# Patient Record
Sex: FEMALE | Race: ASIAN | NOT HISPANIC OR LATINO | Employment: OTHER | URBAN - METROPOLITAN AREA
[De-identification: names, ages, dates, MRNs, and addresses within clinical notes are randomized per-mention and may not be internally consistent; named-entity substitution may affect disease eponyms.]

---

## 2017-01-24 DIAGNOSIS — Z79.4 TYPE 2 DIABETES MELLITUS WITHOUT COMPLICATION, WITH LONG-TERM CURRENT USE OF INSULIN (HCC): ICD-10-CM

## 2017-01-24 DIAGNOSIS — E11.9 TYPE 2 DIABETES MELLITUS WITHOUT COMPLICATION, WITH LONG-TERM CURRENT USE OF INSULIN (HCC): ICD-10-CM

## 2017-02-27 RX ORDER — GLIPIZIDE 10 MG/1
TABLET ORAL
Qty: 60 TAB | Refills: 3 | Status: SHIPPED | OUTPATIENT
Start: 2017-02-27 | End: 2018-04-16

## 2017-03-24 RX ORDER — INSULIN DETEMIR 100 [IU]/ML
INJECTION, SOLUTION SUBCUTANEOUS
Qty: 15 ML | Refills: 5 | Status: SHIPPED | OUTPATIENT
Start: 2017-03-24 | End: 2017-10-31 | Stop reason: SDUPTHER

## 2017-03-27 DIAGNOSIS — I10 ESSENTIAL HYPERTENSION: ICD-10-CM

## 2017-03-27 RX ORDER — LOSARTAN POTASSIUM 50 MG/1
50 TABLET ORAL DAILY
Qty: 90 TAB | Refills: 3 | Status: SHIPPED | OUTPATIENT
Start: 2017-03-27 | End: 2017-04-26 | Stop reason: SDUPTHER

## 2017-03-27 NOTE — TELEPHONE ENCOUNTER
Was the patient seen in the last year in this department? Yes     Does patient have an active prescription for medications requested? No     Received Request Via: Pharmacy       RX : Losartan

## 2017-04-12 ENCOUNTER — TELEPHONE (OUTPATIENT)
Dept: MEDICAL GROUP | Facility: CLINIC | Age: 72
End: 2017-04-12

## 2017-04-12 NOTE — TELEPHONE ENCOUNTER
Per [facility] pharmacy, states pt called requesting that her medication Losartan 50 mg tabs, be switched to Losartan/HCTZ 100-25 MG.    Patient also requesting to take Losartan 50 MG with the Losartan/HCTZ 100-25 MG.  Dr. Ruiz is it okay for pt to take both these medicatios ?    Pharmacy needs clarification.  Please advise, thank you.

## 2017-04-21 ENCOUNTER — HOSPITAL ENCOUNTER (OUTPATIENT)
Dept: LAB | Facility: MEDICAL CENTER | Age: 72
End: 2017-04-21
Attending: INTERNAL MEDICINE
Payer: MEDICARE

## 2017-04-21 DIAGNOSIS — N18.3 CKD (CHRONIC KIDNEY DISEASE), STAGE 3 (MODERATE): ICD-10-CM

## 2017-04-21 DIAGNOSIS — I10 ESSENTIAL HYPERTENSION: ICD-10-CM

## 2017-04-21 DIAGNOSIS — E78.5 DYSLIPIDEMIA: ICD-10-CM

## 2017-04-21 LAB — GFR SERPL CREATININE-BSD FRML MDRD: >60 ML/MIN/1.73 M 2

## 2017-04-21 PROCEDURE — 82565 ASSAY OF CREATININE: CPT

## 2017-04-21 PROCEDURE — 81003 URINALYSIS AUTO W/O SCOPE: CPT

## 2017-04-21 PROCEDURE — 83036 HEMOGLOBIN GLYCOSYLATED A1C: CPT | Mod: GA

## 2017-04-21 PROCEDURE — 83695 ASSAY OF LIPOPROTEIN(A): CPT

## 2017-04-21 PROCEDURE — 36415 COLL VENOUS BLD VENIPUNCTURE: CPT

## 2017-04-22 LAB
APPEARANCE UR: CLEAR
BILIRUB UR QL STRIP.AUTO: NEGATIVE
COLOR UR: NORMAL
CREAT SERPL-MCNC: 0.89 MG/DL (ref 0.5–1.4)
EST. AVERAGE GLUCOSE BLD GHB EST-MCNC: 197 MG/DL
GLUCOSE UR STRIP.AUTO-MCNC: NEGATIVE MG/DL
HBA1C MFR BLD: 8.5 % (ref 0–5.6)
KETONES UR STRIP.AUTO-MCNC: NEGATIVE MG/DL
LEUKOCYTE ESTERASE UR QL STRIP.AUTO: NEGATIVE
MICRO URNS: NORMAL
NITRITE UR QL STRIP.AUTO: NEGATIVE
PH UR STRIP.AUTO: 5.5 [PH]
PROT UR QL STRIP: NEGATIVE MG/DL
RBC UR QL AUTO: NEGATIVE
SP GR UR STRIP.AUTO: 1.02

## 2017-04-23 LAB — LPA SERPL-MCNC: 11 MG/DL

## 2017-04-26 ENCOUNTER — TELEPHONE (OUTPATIENT)
Dept: MEDICAL GROUP | Facility: CLINIC | Age: 72
End: 2017-04-26

## 2017-04-26 ENCOUNTER — OFFICE VISIT (OUTPATIENT)
Dept: MEDICAL GROUP | Facility: CLINIC | Age: 72
End: 2017-04-26
Payer: MEDICARE

## 2017-04-26 VITALS
TEMPERATURE: 97.2 F | DIASTOLIC BLOOD PRESSURE: 80 MMHG | WEIGHT: 140 LBS | RESPIRATION RATE: 16 BRPM | SYSTOLIC BLOOD PRESSURE: 120 MMHG | HEART RATE: 90 BPM | HEIGHT: 61 IN | BODY MASS INDEX: 26.43 KG/M2 | OXYGEN SATURATION: 92 %

## 2017-04-26 DIAGNOSIS — I10 ESSENTIAL HYPERTENSION: ICD-10-CM

## 2017-04-26 DIAGNOSIS — N18.30 CHRONIC KIDNEY DISEASE, STAGE 3 (MODERATE): ICD-10-CM

## 2017-04-26 DIAGNOSIS — E78.5 DYSLIPIDEMIA: ICD-10-CM

## 2017-04-26 DIAGNOSIS — E55.9 VITAMIN D DEFICIENCY: ICD-10-CM

## 2017-04-26 PROCEDURE — G8419 CALC BMI OUT NRM PARAM NOF/U: HCPCS | Performed by: INTERNAL MEDICINE

## 2017-04-26 PROCEDURE — 3014F SCREEN MAMMO DOC REV: CPT | Mod: 8P | Performed by: INTERNAL MEDICINE

## 2017-04-26 PROCEDURE — G8432 DEP SCR NOT DOC, RNG: HCPCS | Performed by: INTERNAL MEDICINE

## 2017-04-26 PROCEDURE — 1101F PT FALLS ASSESS-DOCD LE1/YR: CPT | Performed by: INTERNAL MEDICINE

## 2017-04-26 PROCEDURE — 4040F PNEUMOC VAC/ADMIN/RCVD: CPT | Mod: 8P | Performed by: INTERNAL MEDICINE

## 2017-04-26 PROCEDURE — 3045F PR MOST RECENT HEMOGLOBIN A1C LEVEL 7.0-9.0%: CPT | Performed by: INTERNAL MEDICINE

## 2017-04-26 PROCEDURE — 1036F TOBACCO NON-USER: CPT | Performed by: INTERNAL MEDICINE

## 2017-04-26 PROCEDURE — 99214 OFFICE O/P EST MOD 30 MIN: CPT | Performed by: INTERNAL MEDICINE

## 2017-04-26 RX ORDER — LOSARTAN POTASSIUM 50 MG/1
50 TABLET ORAL DAILY
Qty: 90 TAB | Refills: 3 | Status: SHIPPED | OUTPATIENT
Start: 2017-04-26 | End: 2018-04-10 | Stop reason: SDUPTHER

## 2017-04-26 NOTE — PROGRESS NOTES
Subjective:  Latoya is a 71 y.o. female with the following   Past Medical History   Diagnosis Date   • Hypercholesterolemia    • Hypertension    • Dyslipidemia    • Osteopenia    • Diabetes    • Heart murmur    • Fall       Family History   Problem Relation Age of Onset   • Cancer Mother    • Cancer Father    • Cancer Sister    • Cancer Brother      The patient is on the following medications:   Current outpatient prescriptions:   •  losartan (COZAAR) 50 MG Tab, Take 1 Tab by mouth every day., Disp: 90 Tab, Rfl: 3  •  LEVEMIR 100 UNIT/ML Solution, INJECT 25 UNITS SUBCUTANEOUSLY TWICE DAILY AS DIRECTED, Disp: 15 mL, Rfl: 5  •  glipiZIDE (GLUCOTROL) 10 MG Tab, TAKE ONE TABLET BY MOUTH TWICE DAILY, Disp: 60 Tab, Rfl: 3  •  Insulin Pen Needle (B-D ULTRAFINE III SHORT PEN) 31G X 8 MM Misc, Use q 12 hours, Disp: 100 Each, Rfl: 6  •  syringe 1 ML Misc, Use prior to meal as needed with novolog insulin ., Disp: 100 Each, Rfl: 6  •  insulin aspart (NOVOLOG) 100 UNIT/ML Solution, Use 5 units with each meal if preprandial blood glucose above 150., Disp: 5 Vial, Rfl: 5  •  ASCENSIA MICROFILL TEST strip, USE TO TEST 4 TIMES DAILY AS DIRECTED, Disp: 100 Strip, Rfl: 2  •  Blood Glucose Monitoring Suppl SUPPLIES MISC, Use bid, Disp: 100 Each, Rfl: 6  •  multivitamin (THERAGRAN) TABS, Take 1 Tab by mouth every day. Centrum A to Z , Disp: , Rfl:   •  VITAMIN D 2000 UNIT TABS, Take 2,000 mcg by mouth every day., Disp: , Rfl:   •  COQ10 100 MG PO CAPS, Take  by mouth., Disp: , Rfl:     HPI; Patient is here today for follow-up visit regarding her recent labs, currently on Levemir, glipizide, NovoLog as needed with meals and oral supplements for diabetes denies having polyuria or polydipsia, on losartan for hypertension denies having shortness of breath or chest pain, on vitamin D supplements for vitamin D deficiency denies having back pain.  ROS:  See HPI    Blood pressure 120/80, pulse 90, temperature 36.2 °C (97.2 °F), resp. rate 16,  "height 1.549 m (5' 0.98\"), weight 63.504 kg (140 lb), last menstrual period 01/01/1990, SpO2 92 %.on RA  Objective:  Patient is well appearing and in no acute distress.  Pharynx is clear.  Neck is soft and supple with no cervical or supraclavicular lymphadenopathy, thyromegaly or masses, no JVD.  Lungs clear to auscultation bilaterally with normal respiratory effort. Abdomen soft, non-tender on palpation,not distended. Heart regular rate and rhythm without murmur. Extremities without any clubbing, cyanosis, or edema.    Assessment and Plan:  1. diabetic mellitus; on insulin levemir 50 units daily, glipizide 10 mg p.o. B.i.d, on  NovoLog  with meals, oral supplements and diet modification, followup hemoglobin A1c ;    Ref. Range 6/21/2016 06:10 12/22/2016 06:07 4/21/2017 06:28   Glycohemoglobin Latest Ref Range: 0.0-5.6 % 10.7 (H) 9.5 (H) 8.5 (H)         2. CKD;    Ref. Range 6/21/2016 06:10 12/22/2016 06:07 4/21/2017 06:28   Creatinine Latest Ref Range: 0.50-1.40 mg/dL 1.01 0.96 0.89   GFR If  Latest Ref Range: >60 mL/min/1.73 m 2 >60 >60 >60   GFR If Non  Latest Ref Range: >60 mL/min/1.73 m 2 54 (A) 58 (A) >60           3. Hypertension; on losartan 50 mg daily.       4. Vitamin D deficiency; OTC vitamin D3-2000 units daily.      5. Dyslipidemia; intolerance to statin, patient has taken herself off the medication.      Ref. Range 4/21/2017 06:28   Lipoprotein A Latest Ref Range: <=29 mg/dL 11         Plan   Plan:       Patient is advised regarding preventive and supportive care, diet and lifestyle modification, daily walking ,exercise. Again discussed optimal diabetic therapy and medication modification , alternative therapies , monitor labs. .     Please note that this dictation was created using voice recognition software. I have worked with consultants from the vendor as well as technical experts from [employer] to optimize the interface. I have made every reasonable attempt to " correct obvious errors, but I expect that there are errors of grammar and possibly content that I did not discover before finalizing the note.                                 Please note that this dictation was created using voice recognition software. I have worked with consultants from the vendor as well as technical experts from [facility] to optimize the interface. I have made every reasonable attempt to correct obvious errors, but I expect that there are errors of grammar and possibly content that I did not discover before finalizing the note.

## 2017-04-26 NOTE — MR AVS SNAPSHOT
"Latoya Brush   2017 8:20 AM   Office Visit   MRN: [id number]    Department:  [facility]   Dept Phone:  [phone]    Description:  Female : 1945   Provider:  Alex Ruiz M.D.           Allergies as of 2017     No Known Allergies      You were diagnosed with     Essential hypertension   [4448913]       Insulin dependent diabetes mellitus (CMS-HCC)   [275169]         Vital Signs     Blood Pressure Pulse Temperature Respirations Height Weight    120/80 mmHg 90 36.2 °C (97.2 °F) 16 1.549 m (5' 0.98\") 63.504 kg (140 lb)    Body Mass Index Oxygen Saturation Last Menstrual Period Smoking Status          26.47 kg/m2 92% 1990 Never Smoker         Basic Information     Date Of Birth Sex Race Ethnicity Preferred Language    1945 Female  Non- English      Your appointments     2017  8:20 AM   Established Patient with Alex Ruiz M.D.   [facility]    [address] [address] [phone]           You will be receiving a confirmation call a few days before your appointment from our automated call confirmation system.              Problem List              ICD-10-CM Priority Class Noted - Resolved    Dyslipidemia E78.5   Unknown - Present    Osteopenia M85.80   Unknown - Present    Diabetic neuropathy (CMS-HCC) E11.40   2011 - Present    Syncope R55   2011 - Present    Brain mass G93.9   2011 - Present    Seizure (CMS-HCC) R56.9   2011 - Present    Insulin use (long-term) in type 2 diabetes (CMS-HCC) E11.9, Z79.4   2015 - Present      Health Maintenance        Date Due Completion Dates    IMM DTaP/Tdap/Td Vaccine (1 - Tdap) 11/3/2011 2011    IMM PNEUMOCOCCAL 65+ (ADULT) LOW/MEDIUM RISK SERIES (1 of 2 - PCV13) 2012 ---    MAMMOGRAM 2013, 10/1/2010, 2009, 2009, 2/15/2007    BONE DENSITY 2014, 2/15/2007    DIABETES MONOFILAMENT " / LE EXAM 1/27/2016 1/27/2015 (N/S), 7/18/2014 (N/S), 4/4/2014 (Done), 12/20/2013 (Done)    Override on 1/27/2015: (N/S)    Override on 7/18/2014: (N/S)    Override on 4/4/2014: Done    Override on 12/20/2013: Done    RETINAL SCREENING 12/30/2016 12/30/2015    FASTING LIPID PROFILE 1/29/2017 1/29/2016, 5/26/2015, 10/23/2014, 7/17/2014, 4/17/2014, 4/2/2014, 12/31/2013, 4/2/2013, 12/15/2012, 4/18/2011, 9/15/2010, 9/8/2009, 2/21/2009, 9/10/2008, 11/1/2006    URINE ACR / MICROALBUMIN 6/21/2017 6/21/2016, 5/26/2015, 4/17/2014, 3/3/2010, 9/10/2008    A1C SCREENING 10/21/2017 4/21/2017, 12/22/2016, 6/21/2016, 1/29/2016, 9/28/2015, 5/26/2015, 1/26/2015, 10/23/2014, 7/17/2014, 4/17/2014, 4/2/2014, 12/31/2013, 7/2/2013, 4/2/2013, 12/15/2012, 8/18/2011, 12/14/2010, 9/15/2010, 6/7/2010, 3/3/2010, 9/8/2009, 2/21/2009, 9/10/2008    SERUM CREATININE 4/21/2018 4/21/2017, 12/22/2016, 6/21/2016, 1/29/2016, 9/28/2015, 5/26/2015, 1/26/2015, 10/23/2014, 7/17/2014, 4/17/2014, 4/2/2014, 12/31/2013, 12/18/2013, 7/2/2013, 7/2/2013, 4/2/2013, 12/15/2012, 1/10/2012, 11/3/2011, 11/2/2011, 8/18/2011, 4/18/2011, 12/14/2010, 9/15/2010, 6/7/2010, 3/3/2010, 9/8/2009, 2/21/2009, 9/10/2008, 12/28/2007, 11/1/2006, 5/30/2006    COLONOSCOPY 4/3/2023 4/3/2013 (N/S)    Override on 4/3/2013: (N/S) (not yet)            Current Immunizations     Influenza TIV (IM) 11/2/2011  9:15 PM    SHINGLES VACCINE 12/28/2012    TD Vaccine 11/2/2011  4:00 PM      Below and/or attached are the medications your provider expects you to take. Review all of your home medications and newly ordered medications with your provider and/or pharmacist. Follow medication instructions as directed by your provider and/or pharmacist. Please keep your medication list with you and share with your provider. Update the information when medications are discontinued, doses are changed, or new medications (including over-the-counter products) are added; and carry medication information at all  times in the event of emergency situations     Allergies:  No Known Allergies          Medications  Valid as of: April 26, 2017 -  8:44 AM    Generic Name Brand Name Tablet Size Instructions for use    Blood Glucose Monitoring Suppl (Misc) Blood Glucose Monitoring Suppl SUPPLIES Use bid        Cholecalciferol (Tab) vitamin D 2000 UNIT Take 2,000 mcg by mouth every day.        Coenzyme Q10 (Cap) CoQ10 100 MG Take  by mouth.        GlipiZIDE (Tab) GLUCOTROL 10 MG TAKE ONE TABLET BY MOUTH TWICE DAILY        Glucose Blood (Strip) ASCENSIA MICROFILL TEST  USE TO TEST 4 TIMES DAILY AS DIRECTED        Insulin Aspart (Solution) NOVOLOG 100 UNIT/ML Use 5 units with each meal if preprandial blood glucose above 150.        Insulin Detemir (Solution) LEVEMIR 100 UNIT/ML INJECT 25 UNITS SUBCUTANEOUSLY TWICE DAILY AS DIRECTED        Insulin Pen Needle (Misc) Insulin Pen Needle 31G X 8 MM Use q 12 hours        Losartan Potassium (Tab) COZAAR 50 MG Take 1 Tab by mouth every day.        Multiple Vitamin (Tab) THERAGRAN  Take 1 Tab by mouth every day. Centrum A to Z         Syringe (Disposable) (Misc) syringe 1 ML Use prior to meal as needed with novolog insulin .        .                 Medicines prescribed today were sent to:     [facility] [address] [place], [place] [address] [address] [address]    Phone: [phone] Fax: [phone]    Open 24 Hours?: No      Medication refill instructions:       If your prescription bottle indicates you have medication refills left, it is not necessary to call your provider’s office. Please contact your pharmacy and they will refill your medication.    If your prescription bottle indicates you do not have any refills left, you may request refills at any time through one of the following ways: The online [employer] system (except Urgent Care), by calling your provider’s office, or by asking your pharmacy to contact your provider’s office with a refill request.  Medication refills are processed only during regular business hours and may not be available until the next business day. Your provider may request additional information or to have a follow-up visit with you prior to refilling your medication.   *Please Note: Medication refills are assigned a new Rx number when refilled electronically. Your pharmacy may indicate that no refills were authorized even though a new prescription for the same medication is available at the pharmacy. Please request the medicine by name with the pharmacy before contacting your provider for a refill.        Your To Do List     Future Labs/Procedures Complete By Expires    CREATININE  As directed 4/26/2018    HEMOGLOBIN A1C  As directed 4/26/2018    MICROALBUMIN CREAT RATIO URINE  As directed 4/26/2018         [employer] Access Code: Activation code not generated  Current [place]urop[facility] Status: Active

## 2017-04-26 NOTE — TELEPHONE ENCOUNTER
Per pharmacy, script was faxed in for Losartan (Cozaar) 50 MG Tabs.    Pt states she is supposed to be taking Losartan/HCTZ 100/25 Mg.    Dr. Ruiz, can you please clarify which medication she should be taking.    Please advise, thank you.

## 2017-07-12 RX ORDER — GLIPIZIDE 10 MG/1
TABLET ORAL
Qty: 180 TAB | Refills: 0 | Status: SHIPPED | OUTPATIENT
Start: 2017-07-12 | End: 2017-09-26 | Stop reason: SDUPTHER

## 2017-08-04 ENCOUNTER — PATIENT OUTREACH (OUTPATIENT)
Dept: HEALTH INFORMATION MANAGEMENT | Facility: OTHER | Age: 72
End: 2017-08-04

## 2017-08-04 NOTE — PROGRESS NOTES
Outcome: Pt scheduled AWV with PCP. Declined Nurse Naai.  Unable to verify demographics provide info or discuss care gaps. (only able to verify  name and last name)  We have bad reception/ pt sound to busy.    WebIZ Checked & Epic Updated:  yes    HealthConnect Verified: no

## 2017-08-23 ENCOUNTER — TELEPHONE (OUTPATIENT)
Dept: MEDICAL GROUP | Facility: PHYSICIAN GROUP | Age: 72
End: 2017-08-23

## 2017-08-23 NOTE — TELEPHONE ENCOUNTER
ANNUAL WELLNESS VISIT PRE-VISIT PLANNING     1.  Reviewed note from last office visit with PCP: YES    2.  If any orders were placed at last visit, do we have Results/Consult Notes?        •  Labs - labs order for 11/1/17 appointment       •  Imaging - Imaging was not ordered at last office visit.       •  Referrals - No referrals were ordered at last office visit.    3.  Immunizations were updated in [facility] using WebIZ?: Yes       •  WebIZ Recommendations: FLU and TDAP       •  Is patient due for Tdap? YES. Patient was notified of copay.       •  Is patient due for Shingles? NO     4.  Patient is due for the following Health Maintenance Topics:   Health Maintenance Due   Topic Date Due   • Annual Wellness Visit  1945   • IMM DTaP/Tdap/Td Vaccine (1 - Tdap) 11/03/2011   • IMM PNEUMOCOCCAL 65+ (ADULT) LOW/MEDIUM RISK SERIES (1 of 2 - PCV13) 08/27/2012   • MAMMOGRAM  12/19/2013   • BONE DENSITY  02/18/2014   • DIABETES MONOFILAMENT / LE EXAM  01/27/2016   • RETINAL SCREENING  12/30/2016   • FASTING LIPID PROFILE  01/29/2017   • URINE ACR / MICROALBUMIN  06/21/2017   • IMM INFLUENZA (1) 09/01/2017       - Patient has completed TD and ZOSTAVAX (Shingles) Immunization(s) per WebIZ. Chart has been updated.    5.  Reviewed/Updated the following with patient:       •   Preferred Pharmacy? YES       •   Preferred Lab? YES       •   Medications? YES. Was Abstract Encounter opened and chart updated? YES       •   Social History? YES. Was Abstract Encounter opened and chart updated? YES       •   Family History? YES. Was Abstract Encounter opened and chart updated? YES    6.  Care Team Updated:       •   DME Company (gait device, O2, CPAP, etc.): N\A       •   Other Specialists (eye doctor, derm, GYN, cardiology, endo, etc): YES    7.  Patient has the following Care Path diagnoses on Problem List:  DIABETES    Has patient ever had diabetes education? Yes, and is NOT interested in more at this time.      8.  Specialty  Comments was updated with diagnosis information provided by SCP: N\A    9.  Patient was advised: “This is a free wellness visit. The provider will screen for medical conditions to help you stay healthy. If you have other concerns to address you may be asked to discuss these at a separate visit or there may be an additional fee.”     6.  Patient was informed to arrive 15 min prior to their scheduled appointment and bring in their medication bottles.

## 2017-09-01 ENCOUNTER — APPOINTMENT (OUTPATIENT)
Dept: MEDICAL GROUP | Facility: PHYSICIAN GROUP | Age: 72
End: 2017-09-01
Payer: MEDICARE

## 2017-09-26 ENCOUNTER — TELEPHONE (OUTPATIENT)
Dept: MEDICAL GROUP | Facility: PHYSICIAN GROUP | Age: 72
End: 2017-09-26

## 2017-09-26 NOTE — TELEPHONE ENCOUNTER
I received a form from [employer] requesting signature from  for diabetic supplies for patient.  I spoke with patient and confirmed she is using this company.  She uses insulin and tests BID.  Form completed & faxed back, scanned to media.

## 2017-09-27 RX ORDER — GLIPIZIDE 10 MG/1
TABLET ORAL
Qty: 180 TAB | Refills: 0 | Status: SHIPPED | OUTPATIENT
Start: 2017-09-27 | End: 2018-01-09 | Stop reason: SDUPTHER

## 2017-11-01 ENCOUNTER — HOSPITAL ENCOUNTER (OUTPATIENT)
Dept: LAB | Facility: MEDICAL CENTER | Age: 72
End: 2017-11-01
Attending: INTERNAL MEDICINE
Payer: MEDICARE

## 2017-11-01 DIAGNOSIS — I10 ESSENTIAL HYPERTENSION: ICD-10-CM

## 2017-11-01 LAB
CREAT SERPL-MCNC: 0.82 MG/DL (ref 0.5–1.4)
CREAT UR-MCNC: 76.5 MG/DL
EST. AVERAGE GLUCOSE BLD GHB EST-MCNC: 206 MG/DL
GFR SERPL CREATININE-BSD FRML MDRD: >60 ML/MIN/1.73 M 2
HBA1C MFR BLD: 8.8 % (ref 0–5.6)
MICROALBUMIN UR-MCNC: 16.8 MG/DL
MICROALBUMIN/CREAT UR: 220 MG/G (ref 0–30)

## 2017-11-01 PROCEDURE — 82043 UR ALBUMIN QUANTITATIVE: CPT

## 2017-11-01 PROCEDURE — 82565 ASSAY OF CREATININE: CPT

## 2017-11-01 PROCEDURE — 36415 COLL VENOUS BLD VENIPUNCTURE: CPT

## 2017-11-01 PROCEDURE — 83036 HEMOGLOBIN GLYCOSYLATED A1C: CPT

## 2017-11-01 PROCEDURE — 82570 ASSAY OF URINE CREATININE: CPT

## 2017-11-01 RX ORDER — INSULIN DETEMIR 100 [IU]/ML
INJECTION, SOLUTION SUBCUTANEOUS
Qty: 15 PEN | Refills: 0 | Status: SHIPPED | OUTPATIENT
Start: 2017-11-01 | End: 2017-12-05 | Stop reason: SDUPTHER

## 2017-11-02 ENCOUNTER — TELEPHONE (OUTPATIENT)
Dept: MEDICAL GROUP | Facility: PHYSICIAN GROUP | Age: 72
End: 2017-11-02

## 2017-11-02 NOTE — TELEPHONE ENCOUNTER
ESTABLISHED PATIENT PRE-VISIT PLANNING     Note: Patient will not be contacted if there is no indication to call.     1.  Reviewed notes from the last few office visits within the medical group: Yes    2.  If any orders were placed at last visit or intended to be done for this visit (i.e. 6 mos follow-up), do we have Results/Consult Notes?        •  Labs - Labs ordered, completed on 11/01/17 and results are in chart.   Note: If patient appointment is for lab review and patient did not complete labs,                check with provider if OK to reschedule patient until labs completed.       •  Imaging - Imaging was not ordered at last office visit.       •  Referrals - No referrals were ordered at last office visit.    3. Is this appointment scheduled as a Hospital Follow-Up? No    4.  Immunizations were updated in Epic using WebIZ?: Epic matches WebIZ       •  Web Iz Recommendations: FLU, PREVNAR (PCV13)  and TDAP    5.  Patient is due for the following Health Maintenance Topics:   Health Maintenance Due   Topic Date Due   • Annual Wellness Visit  1945   • IMM DTaP/Tdap/Td Vaccine (1 - Tdap) 11/03/2011   • IMM PNEUMOCOCCAL 65+ (ADULT) LOW/MEDIUM RISK SERIES (1 of 2 - PCV13) 08/27/2012   • MAMMOGRAM  12/19/2013   • BONE DENSITY  02/18/2014   • DIABETES MONOFILAMENT / LE EXAM  01/27/2016   • RETINAL SCREENING  12/30/2016   • FASTING LIPID PROFILE  01/29/2017   • IMM INFLUENZA (1) 09/01/2017       - Patient has completed FLU, TD and ZOSTAVAX (Shingles) Immunization(s) per WebIZ. Chart has been updated.      6.  Patient was NOT informed to arrive 15 min prior to their scheduled appointment and bring in their medication bottles.

## 2017-11-03 ENCOUNTER — OFFICE VISIT (OUTPATIENT)
Dept: MEDICAL GROUP | Facility: PHYSICIAN GROUP | Age: 72
End: 2017-11-03
Payer: MEDICARE

## 2017-11-03 VITALS
HEIGHT: 61 IN | WEIGHT: 142.2 LBS | OXYGEN SATURATION: 94 % | RESPIRATION RATE: 16 BRPM | HEART RATE: 84 BPM | BODY MASS INDEX: 26.85 KG/M2 | DIASTOLIC BLOOD PRESSURE: 80 MMHG | TEMPERATURE: 97.9 F | SYSTOLIC BLOOD PRESSURE: 130 MMHG

## 2017-11-03 DIAGNOSIS — E78.5 DYSLIPIDEMIA: ICD-10-CM

## 2017-11-03 DIAGNOSIS — I10 ESSENTIAL HYPERTENSION: ICD-10-CM

## 2017-11-03 DIAGNOSIS — N18.2 STAGE 2 CHRONIC KIDNEY DISEASE: ICD-10-CM

## 2017-11-03 DIAGNOSIS — E55.9 VITAMIN D DEFICIENCY: ICD-10-CM

## 2017-11-03 PROCEDURE — 99214 OFFICE O/P EST MOD 30 MIN: CPT | Performed by: INTERNAL MEDICINE

## 2017-11-03 NOTE — PROGRESS NOTES
Subjective:  Latoya is a 72 y.o. female with the following   Past Medical History:   Diagnosis Date   • Diabetes    • Dyslipidemia    • Fall    • Heart murmur    • Hypercholesterolemia    • Hypertension    • Osteopenia       Family History   Problem Relation Age of Onset   • Cancer Mother    • Cancer Father    • Cancer Sister    • Cancer Brother      The patient is on the following medications:   Current Outpatient Prescriptions:   •  LEVEMIR FLEXTOUCH 100 UNIT/ML Solution Pen-injector injection, INJECT 25 UNITS SUBCUTANEOUSLY TWICE DAILY AS DIRECTED, Disp: 15 PEN, Rfl: 0  •  losartan (COZAAR) 50 MG Tab, Take 1 Tab by mouth every day., Disp: 90 Tab, Rfl: 3  •  glipiZIDE (GLUCOTROL) 10 MG Tab, TAKE ONE TABLET BY MOUTH TWICE DAILY, Disp: 60 Tab, Rfl: 3  •  Insulin Pen Needle (B-D ULTRAFINE III SHORT PEN) 31G X 8 MM Misc, Use q 12 hours, Disp: 100 Each, Rfl: 6  •  syringe 1 ML Misc, Use prior to meal as needed with novolog insulin ., Disp: 100 Each, Rfl: 6  •  insulin aspart (NOVOLOG) 100 UNIT/ML Solution, Use 5 units with each meal if preprandial blood glucose above 150., Disp: 5 Vial, Rfl: 5  •  ASCENSIA MICROFILL TEST strip, USE TO TEST 4 TIMES DAILY AS DIRECTED, Disp: 100 Strip, Rfl: 2  •  Blood Glucose Monitoring Suppl SUPPLIES MISC, Use bid, Disp: 100 Each, Rfl: 6  •  multivitamin (THERAGRAN) TABS, Take 1 Tab by mouth every day. Centrum A to Z , Disp: , Rfl:   •  VITAMIN D 2000 UNIT TABS, Take 2,000 mcg by mouth every day., Disp: , Rfl:   •  COQ10 100 MG PO CAPS, Take  by mouth., Disp: , Rfl:   •  glipiZIDE (GLUCOTROL) 10 MG Tab, TAKE ONE TABLET BY MOUTH TWICE DAILY, Disp: 180 Tab, Rfl: 0    HPI; Patient is here today for follow-up visit regarding her recent labs, currently on Levemir insulin, NovoLog and glipizide for diabetes denies having polyuria or polydipsia,per patient has not been compliant with diabetic lifestyle and diet.   ROS:  See HPI    Blood pressure 130/80, pulse 84, temperature 36.6 °C (97.9 °F),  "resp. rate 16, height 1.549 m (5' 1\"), weight 64.5 kg (142 lb 3.2 oz), last menstrual period 01/01/1990, SpO2 94 %.on RA  Objective:  Patient is well appearing and in no acute distress.  Pharynx is clear.  Neck is soft and supple with no cervical or supraclavicular lymphadenopathy, thyromegaly or masses, no JVD.  Lungs clear to auscultation bilaterally with normal respiratory effort. Abdomen soft, non-tender on palpation,not distended. Heart regular rate and rhythm without murmur. Extremities without any clubbing, cyanosis, or edema.    Assessment and Plan:  1. diabetic mellitus; on insulin levemir 50 units daily, glipizide 10 mg p.o. B.i.d, on  NovoLog  with meals, oral supplements and diet modification, followup hemoglobin A1c ;    Ref. Range 6/21/2016 06:10 12/22/2016 06:07 4/21/2017 06:28 11/1/2017 06:12   Glycohemoglobin Latest Ref Range: 0.0 - 5.6 % 10.7 (H) 9.5 (H) 8.5 (H) 8.8 (H)      Ref. Range 6/21/2016 06:10 11/1/2017 06:12   Micro Alb Creat Ratio Latest Ref Range: 0 - 30 mg/g 17 220 (H)   Creatinine, Urine Latest Units: mg/dL 244.30 76.50   Microalbumin, Urine Random Latest Units: mg/dL 4.1 16.8           2. CKD;   Ref. Range 12/22/2016 06:07 4/21/2017 06:28 11/1/2017 06:12   Creatinine Latest Ref Range: 0.50 - 1.40 mg/dL 0.96 0.89 0.82   GFR If  Latest Ref Range: >60 mL/min/1.73 m 2 >60 >60 >60   GFR If Non  Latest Ref Range: >60 mL/min/1.73 m 2 58 (A) >60 >60           3. Hypertension; BP is stable on losartan 50 mg daily.         4. Vitamin D deficiency; OTC vitamin D3-2000 units daily.       5. Dyslipidemia; intolerance to statin, patient has taken herself off the medication.        Ref. Range 4/21/2017 06:28   Lipoprotein A Latest Ref Range: <=29 mg/dL 11            Plan   Plan:       Patient is advised regarding preventive and supportive care, diet and lifestyle modification, daily walking ,exercise. Again discussed Compliance with ol diabetic therapy ,  alternative " therapies , monitor labs. .     Please note that this dictation was created using voice recognition software. I have worked with consultants from the vendor as well as technical experts from [facility] to optimize the interface. I have made every reasonable attempt to correct obvious errors, but I expect that there are errors of grammar and possibly content that I did not discover before finalizing the note.

## 2017-12-06 RX ORDER — INSULIN DETEMIR 100 [IU]/ML
INJECTION, SOLUTION SUBCUTANEOUS
Qty: 15 PEN | Refills: 0 | Status: SHIPPED | OUTPATIENT
Start: 2017-12-06

## 2018-01-05 DIAGNOSIS — E11.8 TYPE 2 DIABETES MELLITUS WITH COMPLICATION, WITH LONG-TERM CURRENT USE OF INSULIN (HCC): ICD-10-CM

## 2018-01-05 DIAGNOSIS — Z79.4 TYPE 2 DIABETES MELLITUS WITH COMPLICATION, WITH LONG-TERM CURRENT USE OF INSULIN (HCC): ICD-10-CM

## 2018-01-05 NOTE — TELEPHONE ENCOUNTER
Was the patient seen in the last year in this department? Yes     Does patient have an active prescription for medications requested? No     Received Request Via: Patient      true Metrix glucose strips please send to [facility] on [address]

## 2018-01-10 RX ORDER — GLIPIZIDE 10 MG/1
TABLET ORAL
Qty: 180 TAB | Refills: 0 | Status: SHIPPED | OUTPATIENT
Start: 2018-01-10 | End: 2018-04-16 | Stop reason: SDUPTHER

## 2018-04-10 DIAGNOSIS — I10 ESSENTIAL HYPERTENSION: ICD-10-CM

## 2018-04-10 RX ORDER — LOSARTAN POTASSIUM 50 MG/1
50 TABLET ORAL DAILY
Qty: 90 TAB | Refills: 0 | Status: SHIPPED | OUTPATIENT
Start: 2018-04-10 | End: 2018-07-15 | Stop reason: SDUPTHER

## 2018-04-10 NOTE — TELEPHONE ENCOUNTER
Rx refilled. Patient has upcoming appointment with Dr. Ruiz, please help her schedule an appointment with a new provider.  Arabella Laurent M.D.

## 2018-04-10 NOTE — TELEPHONE ENCOUNTER
Was the patient seen in the last year in this department? No     Does patient have an active prescription for medications requested? No    Received Request Via: Pharmacy

## 2018-04-10 NOTE — TELEPHONE ENCOUNTER
Phone Number Called: [phone] (home)     Message: pt notified and apt is scheduled.     Left Message for patient to call back: N\A

## 2018-04-16 RX ORDER — GLIPIZIDE 10 MG/1
TABLET ORAL
Qty: 180 TAB | Refills: 3 | Status: SHIPPED | OUTPATIENT
Start: 2018-04-16 | End: 2019-02-18 | Stop reason: SDUPTHER

## 2018-04-16 NOTE — TELEPHONE ENCOUNTER
Was the patient seen in the last year in this department? Yes     Does patient have an active prescription for medications requested? No     Received Request Via: Pharmacy     Pending appt with  on 5/31/18

## 2018-08-31 DIAGNOSIS — E11.8 TYPE 2 DIABETES MELLITUS WITH COMPLICATION, WITH LONG-TERM CURRENT USE OF INSULIN (HCC): ICD-10-CM

## 2018-08-31 DIAGNOSIS — Z79.4 TYPE 2 DIABETES MELLITUS WITH COMPLICATION, WITH LONG-TERM CURRENT USE OF INSULIN (HCC): ICD-10-CM

## 2018-08-31 NOTE — TELEPHONE ENCOUNTER
Phone Number Called: [phone] (home)     Message: Spoke with Latoya, she said she has established care with a new provider Dr. Lerner.     Left Message for patient to call back: N\A

## 2018-09-16 NOTE — TELEPHONE ENCOUNTER
Was the patient seen in the last year in this department? Yes     Does patient have an active prescription for medications requested? No     Received Request Via: Pharmacy    
normal (ped)...

## 2021-01-15 DIAGNOSIS — Z23 NEED FOR VACCINATION: ICD-10-CM

## 2022-09-08 ENCOUNTER — DOCUMENTATION (OUTPATIENT)
Dept: HEALTH INFORMATION MANAGEMENT | Facility: OTHER | Age: 77
End: 2022-09-08
Payer: COMMERCIAL

## 2025-01-26 ENCOUNTER — HOSPITAL ENCOUNTER (OUTPATIENT)
Facility: MEDICAL CENTER | Age: 80
End: 2025-01-26
Attending: EMERGENCY MEDICINE | Admitting: HOSPITALIST
Payer: COMMERCIAL

## 2025-01-26 ENCOUNTER — APPOINTMENT (OUTPATIENT)
Dept: RADIOLOGY | Facility: MEDICAL CENTER | Age: 80
End: 2025-01-26
Attending: EMERGENCY MEDICINE
Payer: COMMERCIAL

## 2025-01-26 VITALS
WEIGHT: 125 LBS | DIASTOLIC BLOOD PRESSURE: 77 MMHG | SYSTOLIC BLOOD PRESSURE: 180 MMHG | OXYGEN SATURATION: 94 % | BODY MASS INDEX: 23 KG/M2 | RESPIRATION RATE: 18 BRPM | HEIGHT: 62 IN | HEART RATE: 86 BPM | TEMPERATURE: 98.1 F

## 2025-01-26 DIAGNOSIS — I16.0 HYPERTENSIVE URGENCY: ICD-10-CM

## 2025-01-26 DIAGNOSIS — R73.9 HYPERGLYCEMIA: ICD-10-CM

## 2025-01-26 DIAGNOSIS — R29.810 FACIAL DROOP: ICD-10-CM

## 2025-01-26 DIAGNOSIS — R41.82 ALTERED MENTAL STATUS, UNSPECIFIED ALTERED MENTAL STATUS TYPE: ICD-10-CM

## 2025-01-26 PROBLEM — G45.9 TIA (TRANSIENT ISCHEMIC ATTACK): Status: ACTIVE | Noted: 2025-01-26

## 2025-01-26 PROBLEM — E86.0 DEHYDRATION: Status: ACTIVE | Noted: 2025-01-26

## 2025-01-26 PROBLEM — I10 PRIMARY HYPERTENSION: Status: ACTIVE | Noted: 2025-01-26

## 2025-01-26 LAB
AMPHET UR QL SCN: NEGATIVE
ANION GAP SERPL CALC-SCNC: 18 MMOL/L (ref 7–16)
APPEARANCE UR: CLEAR
BACTERIA #/AREA URNS HPF: ABNORMAL /HPF
BARBITURATES UR QL SCN: NEGATIVE
BASOPHILS # BLD AUTO: 0.8 % (ref 0–1.8)
BASOPHILS # BLD: 0.05 K/UL (ref 0–0.12)
BENZODIAZ UR QL SCN: NEGATIVE
BILIRUB UR QL STRIP.AUTO: NEGATIVE
BUN SERPL-MCNC: 29 MG/DL (ref 8–22)
BZE UR QL SCN: NEGATIVE
CALCIUM SERPL-MCNC: 9.3 MG/DL (ref 8.5–10.5)
CANNABINOIDS UR QL SCN: NEGATIVE
CASTS URNS QL MICRO: ABNORMAL /LPF (ref 0–2)
CHLORIDE SERPL-SCNC: 96 MMOL/L (ref 96–112)
CO2 SERPL-SCNC: 18 MMOL/L (ref 20–33)
COLOR UR: YELLOW
CREAT SERPL-MCNC: 1.2 MG/DL (ref 0.5–1.4)
EOSINOPHIL # BLD AUTO: 0.02 K/UL (ref 0–0.51)
EOSINOPHIL NFR BLD: 0.3 % (ref 0–6.9)
EPITHELIAL CELLS 1715: ABNORMAL /HPF (ref 0–5)
ERYTHROCYTE [DISTWIDTH] IN BLOOD BY AUTOMATED COUNT: 46.1 FL (ref 35.9–50)
EST. AVERAGE GLUCOSE BLD GHB EST-MCNC: 430 MG/DL
ETHANOL BLD-MCNC: <10.1 MG/DL
FENTANYL UR QL: NEGATIVE
GFR SERPLBLD CREATININE-BSD FMLA CKD-EPI: 47 ML/MIN/1.73 M 2
GLUCOSE BLD STRIP.AUTO-MCNC: 278 MG/DL (ref 65–99)
GLUCOSE SERPL-MCNC: 508 MG/DL (ref 65–99)
GLUCOSE UR STRIP.AUTO-MCNC: >=1000 MG/DL
HBA1C MFR BLD: 16.6 % (ref 4–5.6)
HCT VFR BLD AUTO: 49.2 % (ref 37–47)
HGB BLD-MCNC: 16.6 G/DL (ref 12–16)
HOLDING TUBE BB 8507: NORMAL
IMM GRANULOCYTES # BLD AUTO: 0.02 K/UL (ref 0–0.11)
IMM GRANULOCYTES NFR BLD AUTO: 0.3 % (ref 0–0.9)
KETONES UR STRIP.AUTO-MCNC: 40 MG/DL
LEUKOCYTE ESTERASE UR QL STRIP.AUTO: NEGATIVE
LYMPHOCYTES # BLD AUTO: 1.06 K/UL (ref 1–4.8)
LYMPHOCYTES NFR BLD: 16.4 % (ref 22–41)
MCH RBC QN AUTO: 30.9 PG (ref 27–33)
MCHC RBC AUTO-ENTMCNC: 33.7 G/DL (ref 32.2–35.5)
MCV RBC AUTO: 91.4 FL (ref 81.4–97.8)
METHADONE UR QL SCN: NEGATIVE
MICRO URNS: ABNORMAL
MONOCYTES # BLD AUTO: 0.28 K/UL (ref 0–0.85)
MONOCYTES NFR BLD AUTO: 4.3 % (ref 0–13.4)
NEUTROPHILS # BLD AUTO: 5.03 K/UL (ref 1.82–7.42)
NEUTROPHILS NFR BLD: 77.9 % (ref 44–72)
NITRITE UR QL STRIP.AUTO: POSITIVE
NRBC # BLD AUTO: 0 K/UL
NRBC BLD-RTO: 0 /100 WBC (ref 0–0.2)
OPIATES UR QL SCN: NEGATIVE
OXYCODONE UR QL SCN: NEGATIVE
PCP UR QL SCN: NEGATIVE
PH UR STRIP.AUTO: 5.5 [PH] (ref 5–8)
PLATELET # BLD AUTO: 305 K/UL (ref 164–446)
PMV BLD AUTO: 9.9 FL (ref 9–12.9)
POTASSIUM SERPL-SCNC: 4.3 MMOL/L (ref 3.6–5.5)
PROPOXYPH UR QL SCN: NEGATIVE
PROT UR QL STRIP: 100 MG/DL
RBC # BLD AUTO: 5.38 M/UL (ref 4.2–5.4)
RBC # URNS HPF: ABNORMAL /HPF (ref 0–2)
RBC UR QL AUTO: ABNORMAL
SODIUM SERPL-SCNC: 132 MMOL/L (ref 135–145)
SP GR UR STRIP.AUTO: 1.03
TSH SERPL DL<=0.005 MIU/L-ACNC: 1.15 UIU/ML (ref 0.38–5.33)
UROBILINOGEN UR STRIP.AUTO-MCNC: 1 EU/DL
WBC # BLD AUTO: 6.5 K/UL (ref 4.8–10.8)
WBC #/AREA URNS HPF: ABNORMAL /HPF

## 2025-01-26 PROCEDURE — 87077 CULTURE AEROBIC IDENTIFY: CPT

## 2025-01-26 PROCEDURE — 83036 HEMOGLOBIN GLYCOSYLATED A1C: CPT

## 2025-01-26 PROCEDURE — 96376 TX/PRO/DX INJ SAME DRUG ADON: CPT

## 2025-01-26 PROCEDURE — 82962 GLUCOSE BLOOD TEST: CPT

## 2025-01-26 PROCEDURE — 85025 COMPLETE CBC W/AUTO DIFF WBC: CPT

## 2025-01-26 PROCEDURE — 87086 URINE CULTURE/COLONY COUNT: CPT

## 2025-01-26 PROCEDURE — 70450 CT HEAD/BRAIN W/O DYE: CPT

## 2025-01-26 PROCEDURE — 87186 SC STD MICRODIL/AGAR DIL: CPT

## 2025-01-26 PROCEDURE — 80048 BASIC METABOLIC PNL TOTAL CA: CPT

## 2025-01-26 PROCEDURE — 99285 EMERGENCY DEPT VISIT HI MDM: CPT

## 2025-01-26 PROCEDURE — 700111 HCHG RX REV CODE 636 W/ 250 OVERRIDE (IP)

## 2025-01-26 PROCEDURE — 700105 HCHG RX REV CODE 258: Performed by: EMERGENCY MEDICINE

## 2025-01-26 PROCEDURE — 84443 ASSAY THYROID STIM HORMONE: CPT

## 2025-01-26 PROCEDURE — 96374 THER/PROPH/DIAG INJ IV PUSH: CPT

## 2025-01-26 PROCEDURE — 99221 1ST HOSP IP/OBS SF/LOW 40: CPT

## 2025-01-26 PROCEDURE — 80307 DRUG TEST PRSMV CHEM ANLYZR: CPT

## 2025-01-26 PROCEDURE — 700111 HCHG RX REV CODE 636 W/ 250 OVERRIDE (IP): Performed by: EMERGENCY MEDICINE

## 2025-01-26 PROCEDURE — G0378 HOSPITAL OBSERVATION PER HR: HCPCS

## 2025-01-26 PROCEDURE — 82077 ASSAY SPEC XCP UR&BREATH IA: CPT

## 2025-01-26 PROCEDURE — 81001 URINALYSIS AUTO W/SCOPE: CPT | Mod: XU

## 2025-01-26 PROCEDURE — 36415 COLL VENOUS BLD VENIPUNCTURE: CPT

## 2025-01-26 RX ORDER — ONDANSETRON 4 MG/1
4 TABLET, ORALLY DISINTEGRATING ORAL EVERY 4 HOURS PRN
Status: DISCONTINUED | OUTPATIENT
Start: 2025-01-26 | End: 2025-01-26 | Stop reason: HOSPADM

## 2025-01-26 RX ORDER — OXYCODONE HYDROCHLORIDE 5 MG/1
5 TABLET ORAL
Status: DISCONTINUED | OUTPATIENT
Start: 2025-01-26 | End: 2025-01-26 | Stop reason: HOSPADM

## 2025-01-26 RX ORDER — MORPHINE SULFATE 4 MG/ML
2 INJECTION INTRAVENOUS
Status: DISCONTINUED | OUTPATIENT
Start: 2025-01-26 | End: 2025-01-26 | Stop reason: HOSPADM

## 2025-01-26 RX ORDER — LABETALOL HYDROCHLORIDE 5 MG/ML
10 INJECTION, SOLUTION INTRAVENOUS EVERY 6 HOURS PRN
Status: DISCONTINUED | OUTPATIENT
Start: 2025-01-26 | End: 2025-01-26 | Stop reason: HOSPADM

## 2025-01-26 RX ORDER — LORAZEPAM 1 MG/1
0.5 TABLET ORAL
Status: DISCONTINUED | OUTPATIENT
Start: 2025-01-26 | End: 2025-01-26 | Stop reason: HOSPADM

## 2025-01-26 RX ORDER — SODIUM CHLORIDE 9 MG/ML
INJECTION, SOLUTION INTRAVENOUS CONTINUOUS
Status: DISCONTINUED | OUTPATIENT
Start: 2025-01-26 | End: 2025-01-26

## 2025-01-26 RX ORDER — OXYCODONE HYDROCHLORIDE 5 MG/1
2.5 TABLET ORAL
Status: DISCONTINUED | OUTPATIENT
Start: 2025-01-26 | End: 2025-01-26 | Stop reason: HOSPADM

## 2025-01-26 RX ORDER — LABETALOL HYDROCHLORIDE 5 MG/ML
10 INJECTION, SOLUTION INTRAVENOUS ONCE
Status: COMPLETED | OUTPATIENT
Start: 2025-01-26 | End: 2025-01-26

## 2025-01-26 RX ORDER — ACETAMINOPHEN 325 MG/1
650 TABLET ORAL EVERY 6 HOURS PRN
Status: DISCONTINUED | OUTPATIENT
Start: 2025-01-26 | End: 2025-01-26 | Stop reason: HOSPADM

## 2025-01-26 RX ORDER — METOPROLOL SUCCINATE 25 MG/1
50 TABLET, EXTENDED RELEASE ORAL DAILY
Status: DISCONTINUED | OUTPATIENT
Start: 2025-01-27 | End: 2025-01-26 | Stop reason: HOSPADM

## 2025-01-26 RX ORDER — LINAGLIPTIN 5 MG/1
5 TABLET, FILM COATED ORAL DAILY
COMMUNITY
Start: 2024-12-10

## 2025-01-26 RX ORDER — ENOXAPARIN SODIUM 100 MG/ML
40 INJECTION SUBCUTANEOUS DAILY
Status: DISCONTINUED | OUTPATIENT
Start: 2025-01-26 | End: 2025-01-26 | Stop reason: HOSPADM

## 2025-01-26 RX ORDER — METOPROLOL SUCCINATE 50 MG/1
50 TABLET, EXTENDED RELEASE ORAL DAILY
COMMUNITY

## 2025-01-26 RX ORDER — SODIUM CHLORIDE 9 MG/ML
INJECTION, SOLUTION INTRAVENOUS CONTINUOUS
Status: DISCONTINUED | OUTPATIENT
Start: 2025-01-26 | End: 2025-01-26 | Stop reason: HOSPADM

## 2025-01-26 RX ORDER — HYDRALAZINE HYDROCHLORIDE 20 MG/ML
20 INJECTION INTRAMUSCULAR; INTRAVENOUS EVERY 4 HOURS PRN
Status: DISCONTINUED | OUTPATIENT
Start: 2025-01-26 | End: 2025-01-26 | Stop reason: HOSPADM

## 2025-01-26 RX ORDER — DEXTROSE MONOHYDRATE 25 G/50ML
25 INJECTION, SOLUTION INTRAVENOUS
Status: DISCONTINUED | OUTPATIENT
Start: 2025-01-26 | End: 2025-01-26 | Stop reason: HOSPADM

## 2025-01-26 RX ORDER — INSULIN LISPRO 100 [IU]/ML
2-9 INJECTION, SOLUTION INTRAVENOUS; SUBCUTANEOUS
Status: DISCONTINUED | OUTPATIENT
Start: 2025-01-26 | End: 2025-01-26 | Stop reason: HOSPADM

## 2025-01-26 RX ORDER — SODIUM CHLORIDE 9 MG/ML
2000 INJECTION, SOLUTION INTRAVENOUS ONCE
Status: COMPLETED | OUTPATIENT
Start: 2025-01-26 | End: 2025-01-26

## 2025-01-26 RX ORDER — ONDANSETRON 2 MG/ML
4 INJECTION INTRAMUSCULAR; INTRAVENOUS EVERY 4 HOURS PRN
Status: DISCONTINUED | OUTPATIENT
Start: 2025-01-26 | End: 2025-01-26 | Stop reason: HOSPADM

## 2025-01-26 RX ADMIN — LABETALOL HYDROCHLORIDE 10 MG: 5 INJECTION INTRAVENOUS at 17:17

## 2025-01-26 RX ADMIN — LABETALOL HYDROCHLORIDE 10 MG: 5 INJECTION INTRAVENOUS at 18:53

## 2025-01-26 RX ADMIN — SODIUM CHLORIDE 2000 ML: 9 INJECTION, SOLUTION INTRAVENOUS at 17:09

## 2025-01-26 ASSESSMENT — ENCOUNTER SYMPTOMS
GASTROINTESTINAL NEGATIVE: 1
PSYCHIATRIC NEGATIVE: 1
NEUROLOGICAL NEGATIVE: 1
CARDIOVASCULAR NEGATIVE: 1
CONSTITUTIONAL NEGATIVE: 1
MUSCULOSKELETAL NEGATIVE: 1
RESPIRATORY NEGATIVE: 1

## 2025-01-26 ASSESSMENT — PAIN DESCRIPTION - PAIN TYPE: TYPE: ACUTE PAIN

## 2025-01-26 NOTE — ED PROVIDER NOTES
ER Provider Note    Scribed for Salas Dozier M.D. by Debbie Harvey. 1/26/2025   3:27 PM    Primary Care Provider: Alex Ruiz M.D.    CHIEF COMPLAINT  Chief Complaint   Patient presents with    Possible Stroke     EXTERNAL RECORDS REVIEWED      HPI/ROS  LIMITATION TO HISTORY   Select: : None  OUTSIDE HISTORIAN(S):  EMS Provides history of encounter    Latoya Brush is a 79 y.o. female who presents to the ED for evaluation of stroke-like symptoms onset 1 hour ago. EMS reports that the patient [place] a [place] and was found walking around the lot. Denies head strike. Upon initial evaluation they state that the patient had ataxia, substantial left sided facial droop, and drooling that lasted about 2 minutes and then resolved. Her blood sugar is 351 and she has a history of diabetes. Currently in the ED the patient is able to answer all questions and her weakness is resolved. No alleviating or exacerbating factors were noted.    PAST MEDICAL HISTORY  Past Medical History:   Diagnosis Date    Diabetes     Dyslipidemia     Fall     Heart murmur     Hypercholesterolemia     Hypertension     Osteopenia        SURGICAL HISTORY  Past Surgical History:   Procedure Laterality Date    ABDOMINAL HYSTERECTOMY TOTAL  1990       FAMILY HISTORY  Family History   Problem Relation Age of Onset    Cancer Mother     Cancer Father     Cancer Sister     Cancer Brother        SOCIAL HISTORY   reports that she has never smoked. She has never used smokeless tobacco. She reports that she does not drink alcohol and does not use drugs.    CURRENT MEDICATIONS  Current Outpatient Medications   Medication Instructions    Blood Glucose Monitoring Suppl Supplies Misc Use bid    COQ10 100 MG PO CAPS Take  by mouth.    glipiZIDE (GLUCOTROL) 10 MG Tab TAKE 1 TABLET BY MOUTH TWICE DAILY    glucose blood (ASCENSIA MICROFILL TEST) strip USE TO TEST 4 TIMES DAILY AS DIRECTED    insulin aspart (NOVOLOG) 100 UNIT/ML Solution Use 5  units with each meal if preprandial blood glucose above 150.    Insulin Pen Needle (B-D ULTRAFINE III SHORT PEN) 31G X 8 MM Misc Use q 12 hours    LEVEMIR FLEXTOUCH 100 UNIT/ML Solution Pen-injector injection INJECT 25 UNITS SUBCUTANEOUSLY TWICE DAILY AS DIRECTED    losartan (COZAAR) 50 MG Tab TAKE 1 TABLET BY MOUTH ONCE DAILY    multivitamin (THERAGRAN) TABS 1 Tablet, DAILY    syringe 1 ML Misc Use prior to meal as needed with novolog insulin .    vitamin D 2,000 mcg, DAILY        ALLERGIES  No Known Allergies     PHYSICAL EXAM  BP (!) 226/102   Pulse (!) 108   Temp 36.5 °C (97.7 °F)   Resp 14   Wt 59 kg (130 lb)   LMP 01/01/1990   SpO2 92%   BMI 24.56 kg/m²      Nursing note and vitals reviewed.  Constitutional: Well-developed and well-nourished.  Somewhat argumentative.  HENT: Head is normocephalic and atraumatic. Oropharynx is clear and moist without exudate or erythema.   Eyes: Pupils are equal, round, and reactive to light. Conjunctiva are normal.   Cardiovascular: Tachycardic rate and regular rhythm. No murmur heard. Normal radial pulses.  Pulmonary/Chest: Breath sounds normal. No wheezes or rales.   Abdominal: Soft and non-tender. No distention    Musculoskeletal: Extremities exhibit normal range of motion without edema or tenderness.   Neurological: Awake, alert and oriented to person, place, and time. Answers all questions appropriately, somewhat argumentative, no facial droop or weakness. No focal deficits noted.  Skin: Skin is warm and dry. No rash.   Psychiatric: Normal mood and affect. Appropriate for clinical situation    DIAGNOSTIC STUDIES    Labs:   Results for orders placed or performed during the hospital encounter of 01/26/25   CBC WITH DIFFERENTIAL    Collection Time: 01/26/25  3:27 PM   Result Value Ref Range    WBC 6.5 4.8 - 10.8 K/uL    RBC 5.38 4.20 - 5.40 M/uL    Hemoglobin 16.6 (H) 12.0 - 16.0 g/dL    Hematocrit 49.2 (H) 37.0 - 47.0 %    MCV 91.4 81.4 - 97.8 fL    MCH 30.9 27.0 -  33.0 pg    MCHC 33.7 32.2 - 35.5 g/dL    RDW 46.1 35.9 - 50.0 fL    Platelet Count 305 164 - 446 K/uL    MPV 9.9 9.0 - 12.9 fL    Neutrophils-Polys 77.90 (H) 44.00 - 72.00 %    Lymphocytes 16.40 (L) 22.00 - 41.00 %    Monocytes 4.30 0.00 - 13.40 %    Eosinophils 0.30 0.00 - 6.90 %    Basophils 0.80 0.00 - 1.80 %    Immature Granulocytes 0.30 0.00 - 0.90 %    Nucleated RBC 0.00 0.00 - 0.20 /100 WBC    Neutrophils (Absolute) 5.03 1.82 - 7.42 K/uL    Lymphs (Absolute) 1.06 1.00 - 4.80 K/uL    Monos (Absolute) 0.28 0.00 - 0.85 K/uL    Eos (Absolute) 0.02 0.00 - 0.51 K/uL    Baso (Absolute) 0.05 0.00 - 0.12 K/uL    Immature Granulocytes (abs) 0.02 0.00 - 0.11 K/uL    NRBC (Absolute) 0.00 K/uL   BASIC METABOLIC PANEL    Collection Time: 01/26/25  3:27 PM   Result Value Ref Range    Sodium 132 (L) 135 - 145 mmol/L    Potassium 4.3 3.6 - 5.5 mmol/L    Chloride 96 96 - 112 mmol/L    Co2 18 (L) 20 - 33 mmol/L    Glucose 508 (HH) 65 - 99 mg/dL    Bun 29 (H) 8 - 22 mg/dL    Creatinine 1.20 0.50 - 1.40 mg/dL    Calcium 9.3 8.5 - 10.5 mg/dL    Anion Gap 18.0 (H) 7.0 - 16.0   DIAGNOSTIC ALCOHOL    Collection Time: 01/26/25  3:27 PM   Result Value Ref Range    Diagnostic Alcohol <10.1 <10.1 mg/dL   HOLD BLOOD BANK SPECIMEN (NOT TESTED)    Collection Time: 01/26/25  3:27 PM   Result Value Ref Range    Holding Tube - Bb DONE    ESTIMATED GFR    Collection Time: 01/26/25  3:27 PM   Result Value Ref Range    GFR (CKD-EPI) 47 (A) >60 mL/min/1.73 m 2   URINALYSIS,CULTURE IF INDICATED    Collection Time: 01/26/25  4:59 PM    Specimen: Urine, Straight Cath   Result Value Ref Range    Color Yellow     Character Clear     Specific Gravity 1.035 <1.035    Ph 5.5 5.0 - 8.0    Glucose >=1000 (A) Negative mg/dL    Ketones 40 (A) Negative mg/dL    Protein 100 (A) Negative mg/dL    Bilirubin Negative Negative    Urobilinogen, Urine 1.0 <=1.0 EU/dL    Nitrite Positive (A) Negative    Leukocyte Esterase Negative Negative    Occult Blood Trace (A)  Negative    Micro Urine Req Microscopic    URINE MICROSCOPIC (W/UA)    Collection Time: 01/26/25  4:59 PM   Result Value Ref Range    WBC 21-50 (A) /hpf    RBC 0-2 0 - 2 /hpf    Bacteria Many (A) None /hpf    Epithelial Cells 0-2 0 - 5 /hpf    Urine Casts 0-2 0 - 2 /lpf   URINE CULTURE(NEW)    Collection Time: 01/26/25  5:31 PM    Specimen: Urine   Result Value Ref Range    Significant Indicator NEG     Source UR     Site -     Culture Result -      Radiology:   This attending emergency physician has independently interpreted the diagnostic imaging associated with this visit and is awaiting the final reading from the radiologist.   Preliminary interpretation is a follows: CT head shows no evidence of intracranial hemorrhage or mass effect    Radiologist interpretation:   CT-HEAD W/O   Final Result      1. No CT evidence of acute infarct, hemorrhage or new mass.   2. 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   3. Mild to moderate global parenchymal atrophy. Chronic small vessel ischemic changes.         MR-BRAIN-W/O    (Results Pending)   EC-ECHOCARDIOGRAM COMPLETE W/O CONT    (Results Pending)        INITIAL ASSESSMENT AND PLAN    3:27 PM - Patient was evaluated at bedside. Ordered for CT-Head w/o, CBC with diff, CMP, Diagnostic alcohol, Urine drug test, and UA to evaluate. Patient verbalizes understanding and support with my plan of care. Differential diagnoses include but not limited to: alcohol or drug intoxication, TIA, CVA, intracranial mass.     ED Observation Status? No; Patient does not meet criteria for ED Observation.      COURSE AND MEDICAL DECISION MAKING    5:06 PM - The patient will be medicated with IV labetalol for elevated blood pressure    Patient is treated with IV fluid given her significant hyperglycemia.    5:42 PM - I discussed the patient's case and the above findings with Dr. Mcmillan (Hospitalist) who  agrees to evaluate the patient for hospitalization.     HYDRATION: Based on the patient's presentation of Hyperglycemia the patient was given IV fluids. IV Hydration was used because oral hydration was not adequate alone. Upon recheck following hydration, the patient was feeling improved.    DISPOSITION AND DISCUSSIONS    I have discussed management of the patient with the following physicians and DISHA's:  Dr. Mcmillan (Hospitalist)    Discussion of management with other [facility] or appropriate source(s): None     Barriers to care at this time, including but not limited to:  None .     Decision tools and prescription drugs considered including, but not limited to:  NIH 0 .    DISPOSITION:  Patient will be hospitalized by Dr. Mcmillan in guarded condition.    FINAL DIAGNOSIS  1. Altered mental status, unspecified altered mental status type    2. Facial droop    3. Hyperglycemia    4. Hypertensive urgency         Debbie COBB (Scribe), am scribing for, and in the presence of, Salas Dozier M.D..    Electronically signed by: Debbie Harvey (Scribe), 1/26/2025    ISalas M.D. personally performed the services described in this documentation, as scribed by Debbie Harvey in my presence, and it is both accurate and complete.      The note accurately reflects work and decisions made by me.  Salas Dozier M.D.  1/26/2025  9:00 PM

## 2025-01-26 NOTE — ED TRIAGE NOTES
.  Chief Complaint   Patient presents with    Possible Stroke     Pt BIB EMS from [facility]. Per report Pt was seen stumbling and falling in [facility]. Upon EMS arrival Pt was found to have slurred speech and left sided facial droop. Symptoms resolved upon arrival. Pt AOx 4, . Pt denies ETOH, states she tripped and feel.       No Vaccines Administered.

## 2025-01-26 NOTE — PROGRESS NOTES
Brief Vascular Neurology Note    79 y.o. F presenting with reported left facial drop and gait instability per EMS. The [place] and reports that she tripped over a parking curb and fell. Denies headstrike. On presentation the patient is nonfocal with no lateralizing deficits, no facial droop. She is hypertensive with systolics in the 180s. FSBS 350. Do not suspect an acute ischemic process at this time. Noncontrast CT head is negative for acute intracranial abnormalities. Recommend toxic/metabolic work up for further evaluation. Please reconsult neurology with acute developments.    Case reviewed and plan created with Dr. Renan Bright, Vascular Neurology. Please call with any questions.      UZMA Zendejas  Vascular Neurology, Inpatient Neurology  [phone]

## 2025-01-27 NOTE — PROGRESS NOTES
"While getting dressed, this RN noted the pt began to lean backwards, then started foaming at the mouth. Pt also noted to have urinated at this time as well. Charge RN notified. Pt refused to allow us to change her brief. Pt insists she is \"fine\" and \"nothing is wrong with me.\" Pt still A/Ox4 at this time. Notified Marilyn GUSMAN.   "

## 2025-01-27 NOTE — PROGRESS NOTES
Latoya Brush has chosen to leave the hospital against medical advice. The attending physician has not discharged the patient. The provider is aware that the patient is leaving against medical advice. Patient expresses understanding of the risks of leaving the hospital and benefits of admission including but not limited to, the availability and proximity of nurses, physicians, monitoring, diagnostic testing, treatment, and a safe discharge plan. The patient had the opportunity to ask questions about their medical condition and recommended treatment.  Patient is aware that they may return for care at any time.    PIV removed. Pt escorted to ED entrance by this RN and care aide via WC to be picked up by cab. Pt ambulated to cab with steady gait.

## 2025-01-27 NOTE — PROGRESS NOTES
"While conversing with pt, pt expressed that she would like to leave. She states \"I am the caretaker for my , I have to leave right now.\" Explained risks of leaving against medical advice, pt still insisting to leave. Notified Marilyn GUSMAN.  " [facility]  [address] [place] [address] Rd, [address], [place] [address]  [phone]    NAME: Barb Palomo  AGE: 55 y.o. SEX: female    Progress Note    Location: [facility]  POS: 31 ([facility])    Assessment/Plan:    Uncontrolled type 2 diabetes mellitus with hyperglycemia (HCC)  HbA1C: 16.1 (H) 08/16/2024  Goal: < 5.7%  FBG range (9/24/2024 to 10/2/2024) = 76 to 305  Pre-lunch BG range (9/24/2024 to 9/30/2024) = 106 to 349  Pre-dinner BG range (9/24/2024 to 9/30/2024) = 98 to 487  Continue the following meds:  - Insulin Glargine 28 units daily at HS  - Humalog 10 units TID with meals  Add insulin HOLD parameters: HOLD for BG less than 120  Continue Carbohydrate controlled diet     Severe episode of recurrent major depressive disorder, without psychotic features (HCC)  No behavioral disturbance in SNF  Continue the following meds: Cymbalta 60mg BID / PRN Diazepa, 5mg every 12 hours  Consult Behavioral Health CRNP as needed.     Severe protein-calorie malnutrition (HCC)  Weight trends up/down  Meal/ snacks/ drink intake is variable in SNF  BMI: 18.47 kg/m2 (9/30/2024) <= 18.66 kg/m2 (9/23/2024) <= 19.2 kg/m2 (9/16/2024)  Currently on Diabetic diet  Continue weekly weight checks in SNF  RD following     Chronic pain disorder  Including intermittent migraine  Continue the following meds:  - Fiorinal PRN  - Tramadol ER 200mg daily PRN  - Tylenol 650mg Q6 hours PRN  - Baclofen 5mg BID  - Gabapentin 800mg TID  Nursing to continue to assess for pain Q shift while awake      Benign essential hypertension  BP range (9/26-30/2024) = 110/68 to 114/64  HR range (9/26-30/2024) = 76 to 99/min  Not on anti-HTN medication     Pancreatic insufficiency  With Chronic intermittent diarrhea  [place]  [id number] UNIT: 1 tablet TID    Chief complaint / Reason for visit:  Follow- visit    History of Present Illness:  This is a 55-year-old female patient currently admitted at [facility] (9/16/2024 to present) for skilled nursing and  rehabilitation services. Patient is seen and examined today to follow-up for any acute and chronic medical conditions.     Patient is in bed for this visit -alert, cooperative, calm, very pleasant and not in distress.  Patient is verbally engaged with clear coherent speech -oriented to name/birthday/current date/place. Patient acknowledged feeling well today - denies no acute medical concerns during ROS assessment including pain.  Nursing has no acute medical concerns with this visit.    Nursing and prior provider notes reviewed on this visit. Discussed visit with PCP and nursing staff/ supervisor.    Review of Systems:  Per history of present illness, all other systems reviewed and negative.    HISTORY:  Medical Hx: Reviewed, unchanged  Family Hx: Reviewed, unchanged  Soc Hx: Reviewed,  unchanged    ALLERGY: Reviewed, unchanged.   Allergies   Allergen Reactions    Medical Tape Rash    Lexapro [Escitalopram Oxalate]     Escitalopram Other (See Comments) and Palpitations    Other Hives and Rash     Adhesive Tape        PHYSICAL EXAM:  Vital Signs: T98.0F -P78 -R16 BP: 99/68 (9/30/2024) Spo2: 95% RA  Weight: 101.0 lbs (9/30/2024) <= 102.0 lbs (9/23/2024) <= 104.0 lbs (9/16/2024)    General: NAD. Well appearing. No acute distress. Frail stature  Head: Atraumatic. Normocephalic.  Eye Exam: anicteric sclera, no discharge, PERRLA, No injection  Oral Exam: moist mucous membranes, no buccaloropharyngeal erythema, palatine tonsils WNL.  Neck Exam: no anterior cervical lymphadenopathy noted, neck supple. Trachea midline, no carotid bruit, no masses  Cardiovascular: regular rate, regular rhythm, no: murmurs/ rubs/ gallops. S1 and S2 appreciated.  Pulmonary: no wheeze, no rhonchi, no rales. No chest tenderness. Normal chest wall expansion  Abdominal: soft, non-tender, nondistended, bowel sounds audible x 4 quadrants. No palpable hepatosplenomegaly, no tympany  : Non distended bladder.   Extremities and skin: no edema noted,  "no rashes. Intact skin  Neurological: alert, cooperative and responsive, Oriented x 3. No tics, normal sensation to pressure and light touch.  moving all 4 extremities symmetrically.    Laboratory / Imaging results reviewed. Last labs done on 9/23/2024    Current Medications: All medications reviewed and updated in Nursing Home eMAR.    Please note: This note was completed in part utilizing a voice-recognition software may have been used in the preparation of this document. Grammatical errors, random word insertion, spelling mistakes, and incomplete sentences may be an occasional consequence of the system secondary to software limitations, ambient noise and hardware issues. Occasional wrong word or \"sound-alike\" substitutions may have occurred due to the inherent limitations of voice recognition software. At the time of dictation, efforts were made to edit, clarify and/or correct errors. Interpretation should be guided by context. Please read the chart carefully and recognize, using context, where substitutions have occurred. If you have any questions or concerns about the context, text or information contained within the body of this dictation, please contact myself, the provider, for further clarification.      ELISEO Levine  11/7/2024  "

## 2025-01-27 NOTE — ED NOTES
Med Rec complete per pharmacy and patient   Allergies reviewed-  Antibiotics in the past 30 days:no  Anticoagulant in past 14 days:no  Pharmacy patient utilizes:[facility] [address]    Pt unable to verify names of medications. Per pharmacy Metoprolol er 50 mg was dispensed on 09/03/24 for a 90 DS and Tradjenta was dispensed on 12/2024 for a 30 DS

## 2025-01-27 NOTE — ASSESSMENT & PLAN NOTE
Significantly hypertensive in the emergency department  Continue home medication  As needed labetalol and hydralazine for SBP greater than 180 or diastolic BP greater than 100
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
## 2025-01-27 NOTE — ASSESSMENT & PLAN NOTE
With elevated hemoglobin and mild bump in creatinine from baseline  Received 2 L NS in ED  Continue IV fluids

## 2025-01-28 ENCOUNTER — HOSPITAL ENCOUNTER (EMERGENCY)
Facility: MEDICAL CENTER | Age: 80
End: 2025-01-28
Attending: EMERGENCY MEDICINE
Payer: COMMERCIAL

## 2025-01-28 VITALS
TEMPERATURE: 99.1 F | WEIGHT: 125 LBS | SYSTOLIC BLOOD PRESSURE: 202 MMHG | HEART RATE: 85 BPM | HEIGHT: 62 IN | DIASTOLIC BLOOD PRESSURE: 91 MMHG | RESPIRATION RATE: 17 BRPM | OXYGEN SATURATION: 95 % | BODY MASS INDEX: 23 KG/M2
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
## 2025-01-28 DIAGNOSIS — Z13.9 ENCOUNTER FOR MEDICAL SCREENING EXAMINATION: ICD-10-CM

## 2025-01-28 LAB
BACTERIA UR CULT: ABNORMAL
BACTERIA UR CULT: ABNORMAL
SIGNIFICANT IND 70042: ABNORMAL
SITE SITE: ABNORMAL
SOURCE SOURCE: ABNORMAL

## 2025-01-28 PROCEDURE — 99284 EMERGENCY DEPT VISIT MOD MDM: CPT

## 2025-01-28 ASSESSMENT — PAIN DESCRIPTION - PAIN TYPE: TYPE: ACUTE PAIN
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
## 2025-01-29 NOTE — ED NOTES
Patient discharge per order. Oral and written discharge instructions reviewed.  All belongings accounted for and taken with patient. Questions answered, and patient agrees with discharge plan. Encouraged to follow up with PCP. Cab voucher provided.
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
## 2025-01-31 ENCOUNTER — TELEPHONE (OUTPATIENT)
Dept: NEUROLOGY | Facility: MEDICAL CENTER | Age: 80
End: 2025-01-31
Payer: COMMERCIAL

## 2025-02-10 ENCOUNTER — TELEPHONE (OUTPATIENT)
Dept: NEUROLOGY | Facility: MEDICAL CENTER | Age: 80
End: 2025-02-10
Payer: COMMERCIAL

## 2025-02-11 ENCOUNTER — TELEPHONE (OUTPATIENT)
Dept: NEUROLOGY | Facility: MEDICAL CENTER | Age: 80
End: 2025-02-11
Payer: COMMERCIAL